# Patient Record
Sex: MALE | Race: WHITE
[De-identification: names, ages, dates, MRNs, and addresses within clinical notes are randomized per-mention and may not be internally consistent; named-entity substitution may affect disease eponyms.]

---

## 2019-10-01 ENCOUNTER — HOSPITAL ENCOUNTER (EMERGENCY)
Dept: HOSPITAL 53 - M ED | Age: 54
Discharge: HOME | End: 2019-10-01
Payer: COMMERCIAL

## 2019-10-01 VITALS — BODY MASS INDEX: 22.19 KG/M2 | WEIGHT: 146.39 LBS | HEIGHT: 68 IN

## 2019-10-01 VITALS — DIASTOLIC BLOOD PRESSURE: 83 MMHG | SYSTOLIC BLOOD PRESSURE: 128 MMHG

## 2019-10-01 DIAGNOSIS — Z91.018: ICD-10-CM

## 2019-10-01 DIAGNOSIS — K57.32: Primary | ICD-10-CM

## 2019-10-01 DIAGNOSIS — Z79.899: ICD-10-CM

## 2019-10-01 LAB
ALBUMIN SERPL BCG-MCNC: 4 GM/DL (ref 3.2–5.2)
ALT SERPL W P-5'-P-CCNC: 21 U/L (ref 12–78)
BASOPHILS # BLD AUTO: 0.1 10^3/UL (ref 0–0.2)
BASOPHILS NFR BLD AUTO: 0.4 % (ref 0–1)
BILIRUB CONJ SERPL-MCNC: 0.3 MG/DL (ref 0–0.2)
BILIRUB SERPL-MCNC: 1.1 MG/DL (ref 0.2–1)
BUN SERPL-MCNC: 14 MG/DL (ref 7–18)
CALCIUM SERPL-MCNC: 9.6 MG/DL (ref 8.5–10.1)
CHLORIDE SERPL-SCNC: 104 MEQ/L (ref 98–107)
CO2 SERPL-SCNC: 30 MEQ/L (ref 21–32)
CREAT SERPL-MCNC: 1.26 MG/DL (ref 0.7–1.3)
EOSINOPHIL # BLD AUTO: 0.2 10^3/UL (ref 0–0.5)
EOSINOPHIL NFR BLD AUTO: 1.4 % (ref 0–3)
GFR SERPL CREATININE-BSD FRML MDRD: > 60 ML/MIN/{1.73_M2} (ref 56–?)
GLUCOSE SERPL-MCNC: 93 MG/DL (ref 70–100)
HCT VFR BLD AUTO: 45.8 % (ref 42–52)
HGB BLD-MCNC: 15.3 G/DL (ref 13.5–17.5)
LIPASE SERPL-CCNC: 102 U/L (ref 73–393)
LYMPHOCYTES # BLD AUTO: 2.2 10^3/UL (ref 1.5–5)
LYMPHOCYTES NFR BLD AUTO: 17.3 % (ref 24–44)
MCH RBC QN AUTO: 31.4 PG (ref 27–33)
MCHC RBC AUTO-ENTMCNC: 33.4 G/DL (ref 32–36.5)
MCV RBC AUTO: 93.9 FL (ref 80–96)
MONOCYTES # BLD AUTO: 1.1 10^3/UL (ref 0–0.8)
MONOCYTES NFR BLD AUTO: 8.9 % (ref 0–5)
NEUTROPHILS # BLD AUTO: 9 10^3/UL (ref 1.5–8.5)
NEUTROPHILS NFR BLD AUTO: 71.7 % (ref 36–66)
PLATELET # BLD AUTO: 299 10^3/UL (ref 150–450)
POTASSIUM SERPL-SCNC: 5 MEQ/L (ref 3.5–5.1)
PROT SERPL-MCNC: 7.5 GM/DL (ref 6.4–8.2)
RBC # BLD AUTO: 4.88 10^6/UL (ref 4.3–6.1)
SODIUM SERPL-SCNC: 139 MEQ/L (ref 136–145)
WBC # BLD AUTO: 12.6 10^3/UL (ref 4–10)

## 2019-10-01 PROCEDURE — 85025 COMPLETE CBC W/AUTO DIFF WBC: CPT

## 2019-10-01 PROCEDURE — 74177 CT ABD & PELVIS W/CONTRAST: CPT

## 2019-10-01 PROCEDURE — 99284 EMERGENCY DEPT VISIT MOD MDM: CPT

## 2019-10-01 PROCEDURE — 80076 HEPATIC FUNCTION PANEL: CPT

## 2019-10-01 PROCEDURE — 81001 URINALYSIS AUTO W/SCOPE: CPT

## 2019-10-01 PROCEDURE — 96374 THER/PROPH/DIAG INJ IV PUSH: CPT

## 2019-10-01 PROCEDURE — 80048 BASIC METABOLIC PNL TOTAL CA: CPT

## 2019-10-01 PROCEDURE — 83690 ASSAY OF LIPASE: CPT

## 2019-10-01 NOTE — REP
CT abdomen and pelvis with IV but without oral contrast:

 

History:  Elevated bilirubin, right mid and lower quadrant abdominal pain.

 

CT contrast dose:  100 ml of intravenous Isovue 370 is administered.

 

CT findings:  Preliminary digital  radiograph demonstrates air-filled loops

of small bowel throughout the central abdomen.  Nonspecific.  The lung bases are

essentially clear on axial CT images.  There is a small benign hemangioma in the

right lobe of the liver measuring 1.6 cm in diameter.  No significant focal liver

lesion is seen.  Spleen is unremarkable.  No adrenal lesion is observed.  No

pancreatic abnormality is observed.  The gallbladder is unremarkable.  Kidneys

enhance symmetrically and are morphologically intact.  No retroperitoneal mass or

adenopathy is seen.  A normal appendix is seen posterior to the cecum.

 

There is sigmoid colon diverticulosis.  There is some mural thickening and

pericolonic fat streaking associated with the diverticulosis in the rectum and

rectosigmoid consistent with distal colonic diverticulitis. There are tiny

bubbles of intramural or pericolonic gas in this region along the right bowel

wall margin. No free air or abscess is appreciated.  Prostate, seminal vesicles

and urinary bladder are unremarkable.

 

Impression:

 

Findings consistent with acute diverticulitis affecting the distal colon at the

rectosigmoid rectal junction.  No abscess or free air is seen.  There are tiny

bubbles of intramural versus pericolonic air along the wall of the rectosigmoid.

 

 

Electronically Signed by

Mike Manuel MD 10/01/2019 03:49 P

## 2019-10-29 ENCOUNTER — HOSPITAL ENCOUNTER (OUTPATIENT)
Dept: HOSPITAL 53 - M RAD | Age: 54
End: 2019-10-29
Attending: FAMILY MEDICINE
Payer: COMMERCIAL

## 2019-10-29 DIAGNOSIS — R10.32: ICD-10-CM

## 2019-10-29 DIAGNOSIS — R10.31: ICD-10-CM

## 2019-10-29 DIAGNOSIS — N40.1: Primary | ICD-10-CM

## 2019-10-29 LAB
ALBUMIN SERPL BCG-MCNC: 3.8 GM/DL (ref 3.2–5.2)
ALT SERPL W P-5'-P-CCNC: 20 U/L (ref 12–78)
BASOPHILS # BLD AUTO: 0.1 10^3/UL (ref 0–0.2)
BASOPHILS NFR BLD AUTO: 0.7 % (ref 0–1)
BILIRUB SERPL-MCNC: 0.3 MG/DL (ref 0.2–1)
BUN SERPL-MCNC: 19 MG/DL (ref 7–18)
CALCIUM SERPL-MCNC: 9 MG/DL (ref 8.5–10.1)
CHLORIDE SERPL-SCNC: 107 MEQ/L (ref 98–107)
CO2 SERPL-SCNC: 28 MEQ/L (ref 21–32)
CREAT SERPL-MCNC: 0.78 MG/DL (ref 0.7–1.3)
CRP SERPL-MCNC: < 0.3 MG/DL (ref 0–0.3)
EOSINOPHIL # BLD AUTO: 0.2 10^3/UL (ref 0–0.5)
EOSINOPHIL NFR BLD AUTO: 2.3 % (ref 0–3)
ERYTHROCYTE [SEDIMENTATION RATE] IN BLOOD BY WESTERGREN METHOD: 23 MM/HR (ref 0–20)
GFR SERPL CREATININE-BSD FRML MDRD: > 60 ML/MIN/{1.73_M2} (ref 56–?)
GLUCOSE SERPL-MCNC: 100 MG/DL (ref 70–100)
HCT VFR BLD AUTO: 43.2 % (ref 42–52)
HGB BLD-MCNC: 14.6 G/DL (ref 13.5–17.5)
LYMPHOCYTES # BLD AUTO: 2.1 10^3/UL (ref 1.5–5)
LYMPHOCYTES NFR BLD AUTO: 29.9 % (ref 24–44)
MCH RBC QN AUTO: 31.3 PG (ref 27–33)
MCHC RBC AUTO-ENTMCNC: 33.8 G/DL (ref 32–36.5)
MCV RBC AUTO: 92.5 FL (ref 80–96)
MONOCYTES # BLD AUTO: 0.7 10^3/UL (ref 0–0.8)
MONOCYTES NFR BLD AUTO: 9.6 % (ref 0–5)
NEUTROPHILS # BLD AUTO: 4.1 10^3/UL (ref 1.5–8.5)
NEUTROPHILS NFR BLD AUTO: 57.2 % (ref 36–66)
PLATELET # BLD AUTO: 339 10^3/UL (ref 150–450)
POTASSIUM SERPL-SCNC: 4.3 MEQ/L (ref 3.5–5.1)
PROT SERPL-MCNC: 7.2 GM/DL (ref 6.4–8.2)
RBC # BLD AUTO: 4.67 10^6/UL (ref 4.3–6.1)
SODIUM SERPL-SCNC: 139 MEQ/L (ref 136–145)
WBC # BLD AUTO: 7.1 10^3/UL (ref 4–10)

## 2019-10-29 PROCEDURE — 85652 RBC SED RATE AUTOMATED: CPT

## 2019-10-29 PROCEDURE — 36415 COLL VENOUS BLD VENIPUNCTURE: CPT

## 2019-10-29 PROCEDURE — 80053 COMPREHEN METABOLIC PANEL: CPT

## 2019-10-29 PROCEDURE — 86140 C-REACTIVE PROTEIN: CPT

## 2019-10-29 PROCEDURE — 85025 COMPLETE CBC W/AUTO DIFF WBC: CPT

## 2019-10-29 PROCEDURE — 74177 CT ABD & PELVIS W/CONTRAST: CPT

## 2020-01-30 ENCOUNTER — HOSPITAL ENCOUNTER (OUTPATIENT)
Dept: HOSPITAL 53 - M OPP | Age: 55
Discharge: HOME | End: 2020-01-30
Attending: SURGERY
Payer: COMMERCIAL

## 2020-01-30 VITALS — WEIGHT: 140 LBS | BODY MASS INDEX: 21.22 KG/M2 | HEIGHT: 68 IN

## 2020-01-30 VITALS — SYSTOLIC BLOOD PRESSURE: 136 MMHG | DIASTOLIC BLOOD PRESSURE: 91 MMHG

## 2020-01-30 DIAGNOSIS — K64.2: ICD-10-CM

## 2020-01-30 DIAGNOSIS — Z91.018: ICD-10-CM

## 2020-01-30 DIAGNOSIS — K57.30: Primary | ICD-10-CM

## 2020-01-30 DIAGNOSIS — Z79.899: ICD-10-CM

## 2020-01-30 DIAGNOSIS — K64.1: ICD-10-CM

## 2020-01-30 NOTE — ROOR
________________________________________________________________________________

Patient Name: Matt Stahl       Procedure Date: 1/30/2020 9:45 AM

MRN: [id number]                          Account Number: [id number]

YOB: 1965               Age: 54

Room: [facility]                            Gender: Male

Note Status: Finalized                 

________________________________________________________________________________

 

Procedure:           Colonoscopy

Indications:         Follow-up of diverticulitis

Providers:           Leo J. Gosselin Jr, MD

Referring MD:        Lucy Mccormick MD

Requesting Provider: 

Medicines:           Propofol per Anesthesia

Complications:       No immediate complications.

________________________________________________________________________________

Procedure:           Pre-Anesthesia Assessment:

                     - Prior to the procedure, a History and Physical was 

                     performed, and patient medications and allergies were 

                     reviewed. The patient is competent. The risks and 

                     benefits of the procedure and the sedation options and 

                     risks were discussed with the patient. All questions were 

                     answered and informed consent was obtained. Patient 

                     identification and proposed procedure were verified by 

                     the physician and the nurse in the pre-procedure area and 

                     in the procedure room. Mental Status Examination: alert 

                     and oriented. Airway Examination: normal oropharyngeal 

                     airway and neck mobility. Respiratory Examination: clear 

                     to auscultation. CV Examination: normal. ASA Grade 

                     Assessment: II - A patient with mild systemic disease. 

                     After reviewing the risks and benefits, the patient was 

                     deemed in satisfactory condition to undergo the 

                     procedure. The anesthesia plan was to use moderate 

                     sedation / analgesia (conscious sedation). Immediately 

                     prior to administration of medications, the patient was 

                     re-assessed for adequacy to receive sedatives. The heart 

                     rate, respiratory rate, oxygen saturations, blood 

                     pressure, adequacy of pulmonary ventilation, and response 

                     to care were monitored throughout the procedure. The 

                     physical status of the patient was re-assessed after the 

                     procedure.

                     The Colonoscope was introduced through the anus and 

                     advanced to the cecum, identified by the ileocecal valve. 

                     The colonoscopy was performed without difficulty. The 

                     patient tolerated the procedure well. The quality of the 

                     bowel preparation was adequate.

                                                                                

Findings:

     The rectum, recto-sigmoid colon, descending colon, ascending colon and 

     cecum appeared normal.

     Multiple small-mouthed diverticula were found in the sigmoid colon.

     A small polyp was found in the transverse colon. The polyp was removed 

     with a cold snare. Resection was complete, but the polyp tissue was not 

     retrieved.

     Non-bleeding internal hemorrhoids were found during endoscopy. The 

     hemorrhoids were Grade II (internal hemorrhoids that prolapse but reduce 

     spontaneously) and Grade III (internal hemorrhoids that prolapse but 

     require manual reduction).

                                                                                

Impression:          - The rectum, recto-sigmoid colon, descending colon, 

                     ascending colon and cecum are normal.

                     - Diverticulosis in the sigmoid colon.

                     - One small polyp in the transverse colon, removed with a 

                     cold snare. Complete resection. Polyp tissue not 

                     retrieved.

                     - Non-bleeding internal hemorrhoids.

Recommendation:      - Discharge patient to home (ambulatory).

                     - Repeat colonoscopy in 5 years for surveillance.

                                                                                

 

Leo Gosselin MD

_____________________

Leo J Gosselin Jr, MD

1/30/2020 10:23:53 AM

Electronically signed by Leo Gosselin Jr , MD

Number of Addenda: 0

 

Note Initiated On: 1/30/2020 9:45 AM

Estimated Blood Loss:

     Estimated blood loss: none.

## 2020-05-23 ENCOUNTER — HOSPITAL ENCOUNTER (EMERGENCY)
Dept: HOSPITAL 53 - M ED | Age: 55
Discharge: HOME | End: 2020-05-23
Payer: COMMERCIAL

## 2020-05-23 VITALS — WEIGHT: 145.31 LBS | BODY MASS INDEX: 22.02 KG/M2 | HEIGHT: 68 IN

## 2020-05-23 VITALS — DIASTOLIC BLOOD PRESSURE: 93 MMHG | SYSTOLIC BLOOD PRESSURE: 152 MMHG

## 2020-05-23 DIAGNOSIS — S13.4XXA: ICD-10-CM
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
## 2020-05-23 DIAGNOSIS — Z91.018: ICD-10-CM

## 2020-05-23 DIAGNOSIS — F41.9: ICD-10-CM
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
## 2020-05-23 DIAGNOSIS — S09.8XXA: Primary | ICD-10-CM

## 2020-05-23 DIAGNOSIS — M50.322: ICD-10-CM

## 2020-05-23 DIAGNOSIS — Z79.899: ICD-10-CM

## 2020-05-23 DIAGNOSIS — S30.0XXA: ICD-10-CM

## 2020-05-23 NOTE — REP
Clinical:  Trauma.

 

Technique:  Axial noncontrast images from the skull base to the vertex with

coronal re-formations .

 

Comparison: None .

 

Findings:

The ventricles, sulci, and cisterns are normal in position and appearance.

Gray-white differentiation is maintained.  No acute intracranial hemorrhage,

mass/mass effect, pathology or trauma/injury.  No evidence for acute infarction.

No extra-axial fluid collection.  Calvarium is intact.  Paranasal sinuses

demonstrate partial opacification with mucosal thickening and small amounts of

fluid suggesting sinusitis.

 

Impression:

Normal noncontrast head CT.

No evidence for acute intracranial pathology or trauma/injury.

Sinus disease.

 

 

Electronically Signed by

Julien Sinha MD 05/23/2020 03:23 P

## 2020-05-23 NOTE — REP
Clinical:  Trauma .

 

Technique:  Axial noncontrast images from the skull base to the thoracic inlet

with coronal and sagittal re-formations

 

Findings:

Vertebral bodies are intact and there is no evidence for acute fracture /

compression injury or subluxation.  Significant right-sided facet arthropathy at

the C3-4 and C4-5 levels along with mild endplate sclerosis, osteophytosis and

disc space narrowing at the C5-6 level consistent with degenerative change.

Spinal canal is patent.  Spinous processes are intact.  Paravertebral soft

tissues are normal.

 

Impression:

 

1.  No acute fracture / compression injury or acute subluxation.

2.  Multifocal degenerative changes.

 

 

Electronically Signed by

Julien Sinha MD 05/23/2020 03:26 P

## 2020-05-23 NOTE — REP
Clinical:  Trauma .

 

Technique:  Axial noncontrast images from T12 through mid sacrum with coronal and

sagittal re-formations

 

Findings:

Normal alignment and lordosis is maintained.  Lumbar vertebral bodies including

transverse processes and spinous processes are intact and there is no evidence

for acute fracture / compression injury or subluxation.  Spinal canal is patent.

Posterior elements are intact.  No evidence for spondylolysis or

spondylolisthesis.  Paravertebral soft tissues are normal.

 

Impression:

Normal noncontrast lumbosacral spine CT.

No evidence for acute pathology or trauma/injury.

 

 

Electronically Signed by

Julien Sinha MD 05/23/2020 03:30 P

## 2020-06-27 ENCOUNTER — HOSPITAL ENCOUNTER (OUTPATIENT)
Dept: HOSPITAL 53 - M LABSMTC | Age: 55
End: 2020-06-27
Attending: FAMILY MEDICINE
Payer: COMMERCIAL

## 2020-06-27 DIAGNOSIS — Z11.59: ICD-10-CM

## 2020-06-27 DIAGNOSIS — Z01.818: Primary | ICD-10-CM

## 2020-07-13 ENCOUNTER — HOSPITAL ENCOUNTER (OUTPATIENT)
Dept: HOSPITAL 53 - M SLEEP | Age: 55
End: 2020-07-13
Attending: PHYSICIAN ASSISTANT
Payer: COMMERCIAL

## 2020-07-13 DIAGNOSIS — R40.0: Primary | ICD-10-CM

## 2020-07-22 NOTE — SLEEPCENT
DATE OF PROCEDURE: 07/13/2020

 

INTERPRETATION:

Nocturnal polysomnography was performed for evaluation of sleep physiology in

this patient with a history of excessive somnolence, snoring and nonrestorative

sleep.

 

8 hours and 14 minutes of data were reviewed.   There were 393 minutes of sleep

identified.  Sleep latency was prolonged at 54 minutes.  REM latency was mildly

prolonged at 103 minutes.  Sleep architecture was good with three REM cycles.

Overall sleep efficiency was 80.5%.  The electrocardiogram showed sinus rhythm

with an average heart rate of 66 beats per minute.  Rate ranged 60-90.

Occasional unifocal ventricular ectopic beats were seen.  EEG showed some EKG

bleed through but otherwise normal waveforms for awake and sleep.  No focal

events were identified. There were only 7 obstructive respiratory events

identified of 10 seconds in duration or greater for an apnea-hypopnea index well

within normal limits of 1.1, snoring was noted over the course of the study and

arousals from respiratory events occurred 3.5 times per hour.  There was only one

oxygen desaturation below 90%.  Minor limb activity was appreciated.  In the

midportion of the study limb movement arousal index was 3.5.

 

IMPRESSION:

Normal nocturnal polysomnography with snoring.

## 2020-08-14 ENCOUNTER — HOSPITAL ENCOUNTER (INPATIENT)
Dept: HOSPITAL 53 - M ED | Age: 55
LOS: 1 days | Discharge: HOME | DRG: 54 | End: 2020-08-15
Attending: INTERNAL MEDICINE | Admitting: INTERNAL MEDICINE
Payer: COMMERCIAL

## 2020-08-14 DIAGNOSIS — T46.3X5A: ICD-10-CM

## 2020-08-14 DIAGNOSIS — R51: Primary | ICD-10-CM

## 2020-08-14 DIAGNOSIS — I20.1: ICD-10-CM

## 2020-08-14 DIAGNOSIS — F32.9: ICD-10-CM

## 2020-08-14 DIAGNOSIS — F41.9: ICD-10-CM

## 2020-08-14 DIAGNOSIS — Z79.899: ICD-10-CM

## 2020-09-28 LAB
BASOPHILS # BLD AUTO: 0 10^3/UL (ref 0–0.2)
BASOPHILS NFR BLD AUTO: 0.5 % (ref 0–1)
EOSINOPHIL # BLD AUTO: 0.2 10^3/UL (ref 0–0.5)
EOSINOPHIL NFR BLD AUTO: 2.5 % (ref 0–3)
HCT VFR BLD AUTO: 40.8 % (ref 42–52)
HGB BLD-MCNC: 13.4 G/DL (ref 13.5–17.5)
INR PPP: 0.9
LYMPHOCYTES # BLD AUTO: 2.2 10^3/UL (ref 1.5–5)
LYMPHOCYTES NFR BLD AUTO: 29.1 % (ref 24–44)
MCH RBC QN AUTO: 31.2 PG (ref 27–33)
MCHC RBC AUTO-ENTMCNC: 32.8 G/DL (ref 32–36.5)
MCV RBC AUTO: 94.9 FL (ref 80–96)
MONOCYTES # BLD AUTO: 0.7 10^3/UL (ref 0–0.8)
MONOCYTES NFR BLD AUTO: 9.7 % (ref 0–5)
NEUTROPHILS # BLD AUTO: 4.4 10^3/UL (ref 1.5–8.5)
NEUTROPHILS NFR BLD AUTO: 57.8 % (ref 36–66)
PLATELET # BLD AUTO: 264 10^3/UL (ref 150–450)
PROTHROMBIN TIME: 12.3 SECONDS (ref 12.5–14.3)
RBC # BLD AUTO: 4.3 10^6/UL (ref 4.3–6.1)
WBC # BLD AUTO: 7.7 10^3/UL (ref 4–10)

## 2020-10-11 LAB
BASOPHILS # BLD AUTO: 0 10^3/UL (ref 0–0.2)
BASOPHILS NFR BLD AUTO: 0.1 % (ref 0–1)
EOSINOPHIL # BLD AUTO: 0 10^3/UL (ref 0–0.5)
EOSINOPHIL NFR BLD AUTO: 0 % (ref 0–3)
HCT VFR BLD AUTO: 40.7 % (ref 42–52)
HGB BLD-MCNC: 14 G/DL (ref 13.5–17.5)
LYMPHOCYTES # BLD AUTO: 1.3 10^3/UL (ref 1.5–5)
LYMPHOCYTES NFR BLD AUTO: 13.4 % (ref 24–44)
MCH RBC QN AUTO: 31.9 PG (ref 27–33)
MCHC RBC AUTO-ENTMCNC: 34.4 G/DL (ref 32–36.5)
MCV RBC AUTO: 92.7 FL (ref 80–96)
MONOCYTES # BLD AUTO: 0.2 10^3/UL (ref 0–0.8)
MONOCYTES NFR BLD AUTO: 2.2 % (ref 0–5)
NEUTROPHILS # BLD AUTO: 7.9 10^3/UL (ref 1.5–8.5)
NEUTROPHILS NFR BLD AUTO: 83 % (ref 36–66)
PLATELET # BLD AUTO: 287 10^3/UL (ref 150–450)
RBC # BLD AUTO: 4.39 10^6/UL (ref 4.3–6.1)
WBC # BLD AUTO: 9.5 10^3/UL (ref 4–10)

## 2020-11-06 LAB
ALBUMIN SERPL BCG-MCNC: 3.5 GM/DL (ref 3.2–5.2)
ALT SERPL W P-5'-P-CCNC: 65 U/L (ref 12–78)
BILIRUB SERPL-MCNC: 0.4 MG/DL (ref 0.2–1)
BUN SERPL-MCNC: 11 MG/DL (ref 7–18)
CALCIUM SERPL-MCNC: 8.7 MG/DL (ref 8.5–10.1)
CHLORIDE SERPL-SCNC: 109 MEQ/L (ref 98–107)
CO2 SERPL-SCNC: 31 MEQ/L (ref 21–32)
CREAT SERPL-MCNC: 1.05 MG/DL (ref 0.7–1.3)
GFR SERPL CREATININE-BSD FRML MDRD: > 60 ML/MIN/{1.73_M2} (ref 56–?)
GLUCOSE SERPL-MCNC: 104 MG/DL (ref 70–100)
MAGNESIUM SERPL-MCNC: 2.4 MG/DL (ref 1.8–2.4)
POTASSIUM SERPL-SCNC: 4.6 MEQ/L (ref 3.5–5.1)
PROT SERPL-MCNC: 6.7 GM/DL (ref 6.4–8.2)
SODIUM SERPL-SCNC: 143 MEQ/L (ref 136–145)

## 2020-11-08 LAB
ALBUMIN SERPL BCG-MCNC: 3.7 GM/DL (ref 3.2–5.2)
ALT SERPL W P-5'-P-CCNC: 85 U/L (ref 12–78)
BILIRUB SERPL-MCNC: 0.3 MG/DL (ref 0.2–1)
BUN SERPL-MCNC: 17 MG/DL (ref 7–18)
CALCIUM SERPL-MCNC: 8.9 MG/DL (ref 8.5–10.1)
CHLORIDE SERPL-SCNC: 109 MEQ/L (ref 98–107)
CHOLEST SERPL-MCNC: (no result) MG/DL (ref ?–200)
CHOLEST/HDLC SERPL: (no result) {RATIO} (ref ?–5)
CO2 SERPL-SCNC: 24 MEQ/L (ref 21–32)
CREAT SERPL-MCNC: 0.99 MG/DL (ref 0.7–1.3)
GFR SERPL CREATININE-BSD FRML MDRD: > 60 ML/MIN/{1.73_M2} (ref 56–?)
GLUCOSE SERPL-MCNC: 152 MG/DL (ref 70–100)
HDLC SERPL-MCNC: (no result) MG/DL (ref 40–?)
LDLC SERPL CALC-MCNC: (no result) MG/DL (ref ?–100)
MAGNESIUM SERPL-MCNC: 2.3 MG/DL (ref 1.8–2.4)
NONHDLC SERPL-MCNC: (no result) MG/DL
POTASSIUM SERPL-SCNC: 5.1 MEQ/L (ref 3.5–5.1)
PROT SERPL-MCNC: 7 GM/DL (ref 6.4–8.2)
SODIUM SERPL-SCNC: 139 MEQ/L (ref 136–145)
T4 FREE SERPL-MCNC: 0.85 NG/DL (ref 0.76–1.46)
TRIGL SERPL-MCNC: (no result) MG/DL (ref ?–150)
TSH SERPL DL<=0.005 MIU/L-ACNC: (no result) UIU/ML (ref 0.36–3.74)

## 2020-12-08 ENCOUNTER — HOSPITAL ENCOUNTER (OUTPATIENT)
Dept: HOSPITAL 53 - M SFHCPLAZ | Age: 55
End: 2020-12-08
Attending: FAMILY MEDICINE
Payer: COMMERCIAL

## 2020-12-08 DIAGNOSIS — R10.32: Primary | ICD-10-CM

## 2020-12-08 DIAGNOSIS — N40.0: ICD-10-CM

## 2020-12-08 LAB
AMORPH SED URNS QL MICRO: (no result)
APPEARANCE UR: (no result)
BACTERIA UR QL AUTO: NEGATIVE
BASOPHILS # BLD AUTO: 0.1 10^3/UL (ref 0–0.2)
BASOPHILS NFR BLD AUTO: 0.7 % (ref 0–1)
BILIRUB UR QL STRIP.AUTO: NEGATIVE
BUN SERPL-MCNC: 21 MG/DL (ref 7–18)
CALCIUM SERPL-MCNC: 9.9 MG/DL (ref 8.5–10.1)
CHLORIDE SERPL-SCNC: 105 MEQ/L (ref 98–107)
CO2 SERPL-SCNC: 29 MEQ/L (ref 21–32)
CREAT SERPL-MCNC: 1.11 MG/DL (ref 0.7–1.3)
CRP SERPL-MCNC: 0.3 MG/DL (ref 0–0.3)
EOSINOPHIL # BLD AUTO: 0.2 10^3/UL (ref 0–0.5)
EOSINOPHIL NFR BLD AUTO: 2.6 % (ref 0–3)
GFR SERPL CREATININE-BSD FRML MDRD: > 60 ML/MIN/{1.73_M2} (ref 56–?)
GLUCOSE SERPL-MCNC: 98 MG/DL (ref 70–100)
GLUCOSE UR QL STRIP.AUTO: NEGATIVE MG/DL
HCT VFR BLD AUTO: 46.5 % (ref 42–52)
HGB BLD-MCNC: 15.1 G/DL (ref 13.5–17.5)
HGB UR QL STRIP.AUTO: NEGATIVE
KETONES UR QL STRIP.AUTO: NEGATIVE MG/DL
LEUKOCYTE ESTERASE UR QL STRIP.AUTO: NEGATIVE
LYMPHOCYTES # BLD AUTO: 2.5 10^3/UL (ref 1.5–5)
LYMPHOCYTES NFR BLD AUTO: 28.8 % (ref 24–44)
MCH RBC QN AUTO: 30.4 PG (ref 27–33)
MCHC RBC AUTO-ENTMCNC: 32.5 G/DL (ref 32–36.5)
MCV RBC AUTO: 93.6 FL (ref 80–96)
MONOCYTES # BLD AUTO: 0.8 10^3/UL (ref 0–0.8)
MONOCYTES NFR BLD AUTO: 8.7 % (ref 0–5)
MUCOUS THREADS URNS QL MICRO: (no result)
NEUTROPHILS # BLD AUTO: 5.1 10^3/UL (ref 1.5–8.5)
NEUTROPHILS NFR BLD AUTO: 58.7 % (ref 36–66)
NITRITE UR QL STRIP.AUTO: NEGATIVE
PH UR STRIP.AUTO: 7 UNITS (ref 5–9)
PLATELET # BLD AUTO: 296 10^3/UL (ref 150–450)
POTASSIUM SERPL-SCNC: 4.8 MEQ/L (ref 3.5–5.1)
PROT UR QL STRIP.AUTO: NEGATIVE MG/DL
RBC # BLD AUTO: 4.97 10^6/UL (ref 4.3–6.1)
RBC # UR AUTO: 0 /HPF (ref 0–3)
SODIUM SERPL-SCNC: 138 MEQ/L (ref 136–145)
SP GR UR STRIP.AUTO: 1.02 (ref 1–1.03)
SQUAMOUS #/AREA URNS AUTO: 0 /HPF (ref 0–6)
UROBILINOGEN UR QL STRIP.AUTO: 0.2 MG/DL (ref 0–2)
WBC # BLD AUTO: 8.6 10^3/UL (ref 4–10)
WBC #/AREA URNS AUTO: 0 /HPF (ref 0–3)

## 2021-04-06 ENCOUNTER — HOSPITAL ENCOUNTER (OUTPATIENT)
Dept: HOSPITAL 53 - M LAB | Age: 56
End: 2021-04-06
Payer: COMMERCIAL

## 2021-04-06 DIAGNOSIS — Z53.20: Primary | ICD-10-CM

## 2021-04-07 ENCOUNTER — HOSPITAL ENCOUNTER (OUTPATIENT)
Dept: HOSPITAL 53 - M LAB REF | Age: 56
End: 2021-04-07
Payer: COMMERCIAL

## 2021-04-07 DIAGNOSIS — K57.92: Primary | ICD-10-CM

## 2021-04-07 LAB — C DIFF TOX GENS STL QL NAA+PROBE: NEGATIVE

## 2024-03-26 ENCOUNTER — HOSPITAL ENCOUNTER (EMERGENCY)
Dept: HOSPITAL 53 - M ED | Age: 59
Discharge: HOME | End: 2024-03-26
Payer: COMMERCIAL

## 2024-03-26 VITALS — HEIGHT: 68 IN | BODY MASS INDEX: 20.35 KG/M2 | WEIGHT: 134.28 LBS

## 2024-03-26 VITALS — SYSTOLIC BLOOD PRESSURE: 123 MMHG | OXYGEN SATURATION: 98 % | TEMPERATURE: 97.2 F | DIASTOLIC BLOOD PRESSURE: 79 MMHG

## 2024-03-26 DIAGNOSIS — Z79.899: ICD-10-CM

## 2024-03-26 DIAGNOSIS — Z91.018: ICD-10-CM

## 2024-03-26 DIAGNOSIS — I10: ICD-10-CM

## 2024-03-26 DIAGNOSIS — Z79.82: ICD-10-CM

## 2024-03-26 DIAGNOSIS — E78.5: ICD-10-CM

## 2024-03-26 DIAGNOSIS — R07.9: Primary | ICD-10-CM

## 2024-03-26 DIAGNOSIS — Z79.83: ICD-10-CM

## 2024-03-26 DIAGNOSIS — F10.10: ICD-10-CM

## 2024-03-26 LAB
BASOPHILS # BLD AUTO: 0 10^3/UL (ref 0–0.2)
BASOPHILS NFR BLD AUTO: 0.4 % (ref 0–1)
BUN SERPL-MCNC: 15 MG/DL (ref 9–23)
CALCIUM SERPL-MCNC: 9 MG/DL (ref 8.5–10.1)
CHLORIDE SERPL-SCNC: 109 MMOL/L (ref 98–107)
CK MB CFR.DF SERPL CALC: 0.83
CK MB CFR.DF SERPL CALC: 0.84
CK MB SERPL-MCNC: < 1 NG/ML (ref ?–3.6)
CK MB SERPL-MCNC: < 1 NG/ML (ref ?–3.6)
CK SERPL-CCNC: 118 U/L (ref 46–171)
CK SERPL-CCNC: 120 U/L (ref 46–171)
CO2 SERPL-SCNC: 28 MMOL/L (ref 20–31)
CREAT SERPL-MCNC: 0.77 MG/DL (ref 0.7–1.3)
EOSINOPHIL # BLD AUTO: 0.1 10^3/UL (ref 0–0.5)
EOSINOPHIL NFR BLD AUTO: 1.9 % (ref 0–3)
GFR SERPL CREATININE-BSD FRML MDRD: > 60 ML/MIN/{1.73_M2} (ref 56–?)
GLUCOSE SERPL-MCNC: 106 MG/DL (ref 60–100)
HCT VFR BLD AUTO: 40.9 % (ref 42–52)
HGB BLD-MCNC: 13.8 G/DL (ref 13.5–17.5)
LYMPHOCYTES # BLD AUTO: 1.7 10^3/UL (ref 1.5–5)
LYMPHOCYTES NFR BLD AUTO: 23.5 % (ref 24–44)
MCH RBC QN AUTO: 31.2 PG (ref 27–33)
MCHC RBC AUTO-ENTMCNC: 33.7 G/DL (ref 32–36.5)
MCV RBC AUTO: 92.5 FL (ref 80–96)
MONOCYTES # BLD AUTO: 0.6 10^3/UL (ref 0–0.8)
MONOCYTES NFR BLD AUTO: 8.5 % (ref 2–8)
NEUTROPHILS # BLD AUTO: 4.8 10^3/UL (ref 1.5–8.5)
NEUTROPHILS NFR BLD AUTO: 65.3 % (ref 36–66)
PLATELET # BLD AUTO: 331 10^3/UL (ref 150–450)
POTASSIUM SERPL-SCNC: 4.2 MMOL/L (ref 3.5–5.1)
RBC # BLD AUTO: 4.42 10^6/UL (ref 4.3–6.1)
SODIUM SERPL-SCNC: 138 MMOL/L (ref 136–145)
WBC # BLD AUTO: 7.3 10^3/UL (ref 4–10)

## 2024-03-26 RX ADMIN — PANTOPRAZOLE SODIUM ONE MG: 40 TABLET, DELAYED RELEASE ORAL at 11:29
